# Patient Record
Sex: MALE | Race: WHITE | Employment: FULL TIME | URBAN - NONMETROPOLITAN AREA
[De-identification: names, ages, dates, MRNs, and addresses within clinical notes are randomized per-mention and may not be internally consistent; named-entity substitution may affect disease eponyms.]

---

## 2022-11-04 ENCOUNTER — OFFICE VISIT (OUTPATIENT)
Dept: PRIMARY CARE CLINIC | Age: 47
End: 2022-11-04
Payer: COMMERCIAL

## 2022-11-04 VITALS
BODY MASS INDEX: 27.4 KG/M2 | WEIGHT: 202 LBS | OXYGEN SATURATION: 97 % | SYSTOLIC BLOOD PRESSURE: 122 MMHG | DIASTOLIC BLOOD PRESSURE: 74 MMHG | HEART RATE: 74 BPM | TEMPERATURE: 98 F

## 2022-11-04 DIAGNOSIS — M79.645 PAIN IN FINGER OF BOTH HANDS: Primary | ICD-10-CM

## 2022-11-04 DIAGNOSIS — M79.644 PAIN IN FINGER OF BOTH HANDS: Primary | ICD-10-CM

## 2022-11-04 PROCEDURE — 99213 OFFICE O/P EST LOW 20 MIN: CPT | Performed by: NURSE PRACTITIONER

## 2022-11-04 RX ORDER — PREDNISONE 20 MG/1
20 TABLET ORAL 2 TIMES DAILY
Qty: 10 TABLET | Refills: 0 | Status: SHIPPED | OUTPATIENT
Start: 2022-11-04 | End: 2022-11-09

## 2022-11-04 ASSESSMENT — PATIENT HEALTH QUESTIONNAIRE - PHQ9
SUM OF ALL RESPONSES TO PHQ QUESTIONS 1-9: 0
2. FEELING DOWN, DEPRESSED OR HOPELESS: 0
SUM OF ALL RESPONSES TO PHQ9 QUESTIONS 1 & 2: 0
SUM OF ALL RESPONSES TO PHQ QUESTIONS 1-9: 0
1. LITTLE INTEREST OR PLEASURE IN DOING THINGS: 0

## 2022-11-04 ASSESSMENT — ENCOUNTER SYMPTOMS: RESPIRATORY NEGATIVE: 1

## 2022-11-04 NOTE — LETTER
[address] [facility] A department of [facility] 99  Phone: [phone]  Fax: [phone]        SHEFALI aMcias CNP      November 4, 2022    Patient:   Liz Chiang  Date of Birth   1975  Date of visit   11/4/2022        To Whom it May Concern:      Elyssa Carlisle was seen in my clinic on 11/4/2022. Please excuse from work 11/04/22, 11/05/22 and 11/06/22. If you have any questions or concerns, please don't hesitate to call.       Sincerely,      SHEFALI Macias CNP

## 2022-11-04 NOTE — PROGRESS NOTES
[facility]             [address], [address]                        Telephone [phone]             Fax [phone]     Leidy Tang  1975  PXR:[id number]   Date of visit:  11/4/2022    Subjective:    Leidy Tang is a 56 y.o.  male who presents to [facility] today (11/4/2022) for evaluation of:    Chief Complaint   Patient presents with    Hand Pain     Bilat hands knuckle swelling thinks may be arthritis related issues with finger nails think may be reaction to tacky material at work,        Hand Pain   The incident occurred more than 1 week ago (X 6 months). The injury mechanism was repetitive motion (repetitive motion at work with fine motor skills with bilateral hands and applying pressure with fingertips to parts). Pain location: bilateral index finger joints. The quality of the pain is described as aching. The pain does not radiate. The pain is at a severity of 8/10. The pain has been Constant since the incident. Pertinent negatives include no chest pain, muscle weakness, numbness or tingling. Associated symptoms comments: Swelling noted of joints of fingers of bilateral hands. Bilateral index finger DIP joints with pain. Pain increases after working. He is working 12 hour days, 7 days/week. He has also noted the last 2 months that his finger nails are dry and splitting. Nanci Mederos He has tried nothing for the symptoms. The treatment provided no relief. Requested work note. He has the following problem list:  Patient Active Problem List   Diagnosis    Ankle strain    Back pain    Herniated lumbar intervertebral disc        Current medications are:  Current Outpatient Medications   Medication Sig Dispense Refill    predniSONE (DELTASONE) 20 MG tablet Take 1 tablet by mouth 2 times daily for 5 days 10 tablet 0    Cetirizine HCl 10 MG CAPS Take 10 mg by mouth daily as needed.        No current facility-administered medications for this visit. He is allergic to seasonal.. He  reports that he has been smoking cigarettes. He has a 15.00 pack-year smoking history. He has never used smokeless tobacco.      Objective:    Vitals:    11/04/22 1255   BP: 122/74   Site: Right Upper Arm   Position: Sitting   Cuff Size: Large Adult   Pulse: 74   Temp: 98 °F (36.7 °C)   TempSrc: Tympanic   SpO2: 97%   Weight: 202 lb (91.6 kg)     Body mass index is 27.4 kg/m². Review of Systems   Constitutional: Negative. Respiratory: Negative. Cardiovascular: Negative. Negative for chest pain. Musculoskeletal:         Finger joint pain and swelling bilateral hands   Neurological:  Negative for tingling and numbness. Physical Exam  Vitals and nursing note reviewed. Constitutional:       Appearance: Normal appearance. He is well-developed. HENT:      Head: Normocephalic. Jaw: There is normal jaw occlusion. Mouth/Throat:      Lips: Pink. Mouth: Mucous membranes are moist.      Pharynx: Oropharynx is clear. Uvula midline. Eyes:      Pupils: Pupils are equal, round, and reactive to light. Cardiovascular:      Rate and Rhythm: Normal rate and regular rhythm. Heart sounds: Normal heart sounds. Pulmonary:      Effort: Pulmonary effort is normal.      Breath sounds: Normal breath sounds and air entry. Musculoskeletal:      Right hand: Normal range of motion. Normal strength. Normal sensation. There is no disruption of two-point discrimination. Normal capillary refill. Normal pulse. Left hand: Normal range of motion. Normal strength. Normal sensation. There is no disruption of two-point discrimination. Normal capillary refill. Normal pulse. Cervical back: Normal range of motion and neck supple. Comments: Swelling and redness of bilateral index finger DIP joints. Swelling noted in other DIP joints also. Bilateral hand dryness noted.  Dryness and splitting of several finger nails.   Skin:     General: Skin is warm and dry. Neurological:      General: No focal deficit present. Mental Status: He is alert and oriented to person, place, and time. Psychiatric:         Behavior: Behavior normal.         Thought Content: Thought content normal.       Assessment and Plan:    No results found for this visit on 11/04/22. Diagnosis Orders   1. Pain in finger of both hands  predniSONE (DELTASONE) 20 MG tablet        Take prednisone as directed. Take multivitamin daily. I recommended to apply moisturizing cream often throughout the day. Follow up with PCP if symptoms persist or worsen. Work note provided. The use, risks, benefits, and side effects of prescribed or recommended medications were discussed. All questions were answered and the patient/caregiver voiced understanding. No orders of the defined types were placed in this encounter.         Electronically signed by SHEFALI Guerra CNP on 11/4/22 at 1:16 PM EDT

## 2023-09-05 ENCOUNTER — OFFICE VISIT (OUTPATIENT)
Dept: FAMILY MEDICINE CLINIC | Age: 48
End: 2023-09-05
Payer: COMMERCIAL

## 2023-09-05 VITALS
WEIGHT: 199.8 LBS | BODY MASS INDEX: 27.06 KG/M2 | DIASTOLIC BLOOD PRESSURE: 74 MMHG | OXYGEN SATURATION: 97 % | SYSTOLIC BLOOD PRESSURE: 122 MMHG | TEMPERATURE: 97.9 F | HEART RATE: 88 BPM | RESPIRATION RATE: 16 BRPM | HEIGHT: 72 IN

## 2023-09-05 DIAGNOSIS — B88.0: Primary | ICD-10-CM

## 2023-09-05 PROBLEM — J30.2 SEASONAL ALLERGIES: Status: ACTIVE | Noted: 2021-07-27

## 2023-09-05 PROCEDURE — 99213 OFFICE O/P EST LOW 20 MIN: CPT | Performed by: NURSE PRACTITIONER

## 2023-09-05 RX ORDER — PREDNISONE 10 MG/1
TABLET ORAL
Qty: 20 TABLET | Refills: 0 | Status: SHIPPED | OUTPATIENT
Start: 2023-09-05 | End: 2023-09-13

## 2023-09-05 SDOH — ECONOMIC STABILITY: FOOD INSECURITY: WITHIN THE PAST 12 MONTHS, THE FOOD YOU BOUGHT JUST DIDN'T LAST AND YOU DIDN'T HAVE MONEY TO GET MORE.: NEVER TRUE

## 2023-09-05 SDOH — ECONOMIC STABILITY: FOOD INSECURITY: WITHIN THE PAST 12 MONTHS, YOU WORRIED THAT YOUR FOOD WOULD RUN OUT BEFORE YOU GOT MONEY TO BUY MORE.: NEVER TRUE

## 2023-09-05 SDOH — ECONOMIC STABILITY: HOUSING INSECURITY
IN THE LAST 12 MONTHS, WAS THERE A TIME WHEN YOU DID NOT HAVE A STEADY PLACE TO SLEEP OR SLEPT IN A SHELTER (INCLUDING NOW)?: NO

## 2023-09-05 SDOH — ECONOMIC STABILITY: INCOME INSECURITY: HOW HARD IS IT FOR YOU TO PAY FOR THE VERY BASICS LIKE FOOD, HOUSING, MEDICAL CARE, AND HEATING?: NOT HARD AT ALL

## 2023-09-05 ASSESSMENT — PATIENT HEALTH QUESTIONNAIRE - PHQ9
SUM OF ALL RESPONSES TO PHQ QUESTIONS 1-9: 0
SUM OF ALL RESPONSES TO PHQ QUESTIONS 1-9: 0
2. FEELING DOWN, DEPRESSED OR HOPELESS: 0
SUM OF ALL RESPONSES TO PHQ9 QUESTIONS 1 & 2: 0
SUM OF ALL RESPONSES TO PHQ QUESTIONS 1-9: 0
1. LITTLE INTEREST OR PLEASURE IN DOING THINGS: 0
SUM OF ALL RESPONSES TO PHQ QUESTIONS 1-9: 0

## 2023-09-05 NOTE — PATIENT INSTRUCTIONS
Prednisone taper as directed. Follow up with primary care provider in 1 to 2 days if needed. Note for today.   Recommend preventative health care

## 2023-09-26 ENCOUNTER — INITIAL CONSULT (OUTPATIENT)
Dept: SURGERY | Age: 48
End: 2023-09-26
Payer: COMMERCIAL

## 2023-09-26 VITALS
DIASTOLIC BLOOD PRESSURE: 84 MMHG | WEIGHT: 199 LBS | SYSTOLIC BLOOD PRESSURE: 122 MMHG | BODY MASS INDEX: 26.95 KG/M2 | HEART RATE: 76 BPM | HEIGHT: 72 IN

## 2023-09-26 DIAGNOSIS — K62.5 RECTAL BLEEDING: Primary | ICD-10-CM

## 2023-09-26 DIAGNOSIS — Z12.11 ENCOUNTER FOR SCREENING COLONOSCOPY: ICD-10-CM

## 2023-09-26 PROCEDURE — 99204 OFFICE O/P NEW MOD 45 MIN: CPT | Performed by: SURGERY

## 2023-09-26 RX ORDER — BISACODYL 5 MG/1
TABLET, DELAYED RELEASE ORAL
Qty: 2 TABLET | Refills: 0 | Status: SHIPPED | OUTPATIENT
Start: 2023-09-26

## 2023-09-26 RX ORDER — POLYETHYLENE GLYCOL 3350, SODIUM SULFATE ANHYDROUS, SODIUM BICARBONATE, SODIUM CHLORIDE, POTASSIUM CHLORIDE 236; 22.74; 6.74; 5.86; 2.97 G/4L; G/4L; G/4L; G/4L; G/4L
POWDER, FOR SOLUTION ORAL
Qty: 4000 ML | Refills: 0 | Status: SHIPPED | OUTPATIENT
Start: 2023-09-26

## 2023-09-26 NOTE — PROGRESS NOTES
Lillian Pickett is a 53 y.o. male who presents today for further evaluation of possible hemorrhoid. Patient reports for the past few years that he has noticed a \"polyp\" that he can feel when he is cleaning after bowel movements. Has used preparation H in the past and states that the lump is seem to go down but more recently has begun to notice second lump that he can feel may be a little deeper when he is cleaning after bowel movements. Not painful to touch. Does state that it is present at all times. Does occasionally get some blood per rectum with bowel movements that staining paper. Not had any other changes in bowel movements. No unintended weight loss. Denies any family history of colon cancer. Has never had colon cancer screening before. Comes in today for further evaluation and recommendations. Past Medical History:   Diagnosis Date    Chronic back pain     occassional    Measles     Seasonal allergies     Sleep apnea     QUESTIONABLE-NOT FORMALLY DX    Strain of ankle, left 5/14/2013    Tobacco use        Past Surgical History:   Procedure Laterality Date    HAND SURGERY Left 7/10/2013    juliet mass removed Dr Tadeo Luna biopsy was sent    LUMBAR DISCECTOMY  05/20/14    [id number]    PRE-MALIGNANT / BENIGN SKIN LESION EXCISION  2003-2005    X 10 MOLE REMOVAL NECK, CHEST, BACK, ABD AND LT ARM       Current Outpatient Medications   Medication Sig Dispense Refill    polyethylene glycol (GOLYTELY) 236 g solution Mix as directed. At 4pm the day prior to your procedure, drink an 8oz glass every 10 minutes until gone. 4000 mL 0    bisacodyl 5 MG EC tablet Take 2 tabs by mouth at noon the day prior to your procedure. 2 tablet 0     No current facility-administered medications for this visit.        Allergies   Allergen Reactions    Seasonal        Family History   Problem Relation Age of Onset    Heart Disease Mother         quad by pass    Diabetes Father     Cancer Father         lung    Mult

## 2023-09-26 NOTE — ASSESSMENT & PLAN NOTE
Patient is due for colonoscopy for screening. We will go ahead and plan for colonoscopy for and this will also give us a better chance to evaluate for any small internal hemorrhoids. We will tentatively plan for hemorrhoid banding at the same time. Risks of the procedure including bleeding, infection, perforation, discomfort after procedure and anesthesia risk are discussed and consent is obtained.

## 2023-10-05 ENCOUNTER — ANESTHESIA (OUTPATIENT)
Dept: OPERATING ROOM | Age: 48
End: 2023-10-05
Payer: COMMERCIAL

## 2023-10-05 ENCOUNTER — HOSPITAL ENCOUNTER (OUTPATIENT)
Age: 48
Setting detail: OUTPATIENT SURGERY
Discharge: HOME | End: 2023-10-05
Attending: SURGERY | Admitting: SURGERY
Payer: COMMERCIAL

## 2023-10-05 ENCOUNTER — ANESTHESIA EVENT (OUTPATIENT)
Dept: OPERATING ROOM | Age: 48
End: 2023-10-05
Payer: COMMERCIAL

## 2023-10-05 VITALS
DIASTOLIC BLOOD PRESSURE: 65 MMHG | TEMPERATURE: 97.3 F | HEART RATE: 73 BPM | HEIGHT: 72 IN | WEIGHT: 198 LBS | BODY MASS INDEX: 26.82 KG/M2 | SYSTOLIC BLOOD PRESSURE: 115 MMHG | OXYGEN SATURATION: 98 % | RESPIRATION RATE: 16 BRPM

## 2023-10-05 PROCEDURE — 3700000000 HC ANESTHESIA ATTENDED CARE: Performed by: SURGERY

## 2023-10-05 PROCEDURE — 2709999900 HC NON-CHARGEABLE SUPPLY: Performed by: SURGERY

## 2023-10-05 PROCEDURE — 2580000003 HC RX 258: Performed by: SURGERY

## 2023-10-05 PROCEDURE — 3700000001 HC ADD 15 MINUTES (ANESTHESIA): Performed by: SURGERY

## 2023-10-05 PROCEDURE — 3609027000 HC COLONOSCOPY: Performed by: SURGERY

## 2023-10-05 PROCEDURE — 6360000002 HC RX W HCPCS: Performed by: NURSE ANESTHETIST, CERTIFIED REGISTERED

## 2023-10-05 PROCEDURE — 7100000010 HC PHASE II RECOVERY - FIRST 15 MIN: Performed by: SURGERY

## 2023-10-05 PROCEDURE — 2580000003 HC RX 258: Performed by: NURSE ANESTHETIST, CERTIFIED REGISTERED

## 2023-10-05 PROCEDURE — 7100000011 HC PHASE II RECOVERY - ADDTL 15 MIN: Performed by: SURGERY

## 2023-10-05 RX ORDER — PROPOFOL 10 MG/ML
INJECTION, EMULSION INTRAVENOUS PRN
Status: DISCONTINUED | OUTPATIENT
Start: 2023-10-05 | End: 2023-10-05 | Stop reason: SDUPTHER

## 2023-10-05 RX ORDER — SODIUM CHLORIDE, SODIUM LACTATE, POTASSIUM CHLORIDE, CALCIUM CHLORIDE 600; 310; 30; 20 MG/100ML; MG/100ML; MG/100ML; MG/100ML
INJECTION, SOLUTION INTRAVENOUS CONTINUOUS
Status: DISCONTINUED | OUTPATIENT
Start: 2023-10-05 | End: 2023-10-05 | Stop reason: HOSPADM

## 2023-10-05 RX ORDER — SODIUM CHLORIDE 0.9 % (FLUSH) 0.9 %
5-40 SYRINGE (ML) INJECTION PRN
Status: DISCONTINUED | OUTPATIENT
Start: 2023-10-05 | End: 2023-10-05 | Stop reason: HOSPADM

## 2023-10-05 RX ORDER — SODIUM CHLORIDE 9 MG/ML
INJECTION, SOLUTION INTRAVENOUS PRN
Status: DISCONTINUED | OUTPATIENT
Start: 2023-10-05 | End: 2023-10-05 | Stop reason: HOSPADM

## 2023-10-05 RX ORDER — SODIUM CHLORIDE, SODIUM LACTATE, POTASSIUM CHLORIDE, CALCIUM CHLORIDE 600; 310; 30; 20 MG/100ML; MG/100ML; MG/100ML; MG/100ML
INJECTION, SOLUTION INTRAVENOUS CONTINUOUS PRN
Status: DISCONTINUED | OUTPATIENT
Start: 2023-10-05 | End: 2023-10-05 | Stop reason: SDUPTHER

## 2023-10-05 RX ORDER — SODIUM CHLORIDE 0.9 % (FLUSH) 0.9 %
5-40 SYRINGE (ML) INJECTION EVERY 12 HOURS SCHEDULED
Status: DISCONTINUED | OUTPATIENT
Start: 2023-10-05 | End: 2023-10-05 | Stop reason: HOSPADM

## 2023-10-05 RX ADMIN — SODIUM CHLORIDE, POTASSIUM CHLORIDE, SODIUM LACTATE AND CALCIUM CHLORIDE: 600; 310; 30; 20 INJECTION, SOLUTION INTRAVENOUS at 09:50

## 2023-10-05 RX ADMIN — PROPOFOL 140 MCG/KG/MIN: 10 INJECTION, EMULSION INTRAVENOUS at 10:02

## 2023-10-05 RX ADMIN — SODIUM CHLORIDE, POTASSIUM CHLORIDE, SODIUM LACTATE AND CALCIUM CHLORIDE: 600; 310; 30; 20 INJECTION, SOLUTION INTRAVENOUS at 10:00

## 2023-10-05 RX ADMIN — PROPOFOL 150 MG: 10 INJECTION, EMULSION INTRAVENOUS at 10:01

## 2023-10-05 ASSESSMENT — LIFESTYLE VARIABLES: SMOKING_STATUS: 1

## 2023-10-05 ASSESSMENT — PAIN SCALES - GENERAL
PAINLEVEL_OUTOF10: 0

## 2023-10-05 ASSESSMENT — PAIN - FUNCTIONAL ASSESSMENT: PAIN_FUNCTIONAL_ASSESSMENT: 0-10

## 2023-10-05 NOTE — H&P
Subjective   Ewa Atkins is a 53 y.o. male who presents today for further evaluation of possible hemorrhoid. Patient reports for the past few years that he has noticed a \"polyp\" that he can feel when he is cleaning after bowel movements. Has used preparation H in the past and states that the lump is seem to go down but more recently has begun to notice second lump that he can feel may be a little deeper when he is cleaning after bowel movements. Not painful to touch. Does state that it is present at all times. Does occasionally get some blood per rectum with bowel movements that staining paper. Not had any other changes in bowel movements. No unintended weight loss. Denies any family history of colon cancer. Has never had colon cancer screening before. Comes in today for further evaluation and recommendations. Past Medical History        Past Medical History:   Diagnosis Date    Chronic back pain       occassional    Measles      Seasonal allergies      Sleep apnea       QUESTIONABLE-NOT FORMALLY DX    Strain of ankle, left 5/14/2013    Tobacco use              Past Surgical History         Past Surgical History:   Procedure Laterality Date    HAND SURGERY Left 7/10/2013     juliet mass removed Dr Bill Curran biopsy was sent    LUMBAR DISCECTOMY   05/20/14     [id number]    PRE-MALIGNANT / BENIGN SKIN LESION EXCISION   2003-2005     X 10 MOLE REMOVAL NECK, CHEST, BACK, ABD AND LT ARM            Current Facility-Administered Medications          Current Outpatient Medications   Medication Sig Dispense Refill    polyethylene glycol (GOLYTELY) 236 g solution Mix as directed. At 4pm the day prior to your procedure, drink an 8oz glass every 10 minutes until gone. 4000 mL 0    bisacodyl 5 MG EC tablet Take 2 tabs by mouth at noon the day prior to your procedure. 2 tablet 0      No current facility-administered medications for this visit.                  Allergies   Allergen Reactions    Seasonal

## 2023-10-05 NOTE — OP NOTE
[address] [facility] [address]                 [address] [facility], [address]                                OPERATIVE REPORT    PATIENT NAME: Tray Kimball                       :        1975  MED REC NO:   [id number]                             ROOM:  ACCOUNT NO:   [de-identified]                           ADMIT DATE: 10/05/2023  PROVIDER:     Bebe Ellington    DATE OF PROCEDURE:  10/05/2023    SURGEON:  Dr. Bebe Ellington. ASSISTANT:  None. PREOPERATIVE DIAGNOSIS:  Screening. POSTOPERATIVE DIAGNOSES:  1.  Screening. 2.  Diverticulosis. 3.  Small nonthrombosed external hemorrhoids. PROCEDURE:  Colonoscopy. ANESTHESIA:  MAC.    ESTIMATED BLOOD LOSS:  Minimal.    FLUIDS:  Per anesthesia record. COMPLICATIONS:  None. SPECIMENS:  None. INDICATION FOR PROCEDURE:  The patient is a 52-year-old gentleman  referred to my office for screening as well as some complaints of  possible hemorrhoids. When he was evaluated in the office, he was noted  to have a small nonthrombosed external hemorrhoid, but no other  findings. We decided to go ahead and proceed with colonoscopy with  possible hemorrhoid banding. Prior to the time of the procedure, risks,  benefits, and alternatives were explained to the patient and consent was  obtained. DESCRIPTION OF PROCEDURE:  The patient was brought to the endoscopy  suite, kept on preop gurney, and placed in the left lateral decubitus  position. Monitoring devices were placed. MAC anesthesia was induced. After induction of anesthesia, time-out was performed, and correct  patient and procedure were verified. Digital rectal exam was performed  which showed no significant abnormalities. The Olympus video endoscope  was lubricated, inserted into the patient's rectum which was gently  insufflated with air.   Scope was then slowly advanced through colon  under visualization to the level of cecum which was identified

## 2023-10-05 NOTE — DISCHARGE INSTRUCTIONS
Discharge Instructions for Colonoscopy     Colonoscopy is a visual exam of the lining of the large intestine, also called the bowel or colon, with a colonoscope. A colonoscope is a flexible tube with a light and a viewing device. It allows the doctor to view the inside of the colon through a tiny video camera. Colonoscopy is performed for many reasons: unexplained anemia , pain, diarrhea , bloody stools, cancer screening, among many other reasons. Complications from a colonoscopy are rare. Some possible serious complications include perforated bowel (which might require surgery) and bleeding (which could require blood transfusion ). Minor complications include bloating, gas, and cramping that can last for 1-2 days after the procedure. Because air is put into your colon during the procedure, it is normal to pass large amounts of air from your rectum. You may not have a bowel movement for 1-3 days after the procedure. What You Will Need   Someone to drive you home after the procedure    Steps to [facility] when you get home. Because the sedative will make you drowsy, don't drive, operate machinery, or make important decisions the day of the procedure. Feelings of bloating, gas, or cramping may persist for 24 hours. Diet    Try sips of water first. If tolerated, resume regular diet or the diet recommended by your physician. Do not drink alcohol for 24 hours. Physical Activity    Ask your doctor when you will be able to return to work. Do not drive, operate heavy machinery, or do activities that require coordination or balance for 24 hours. Otherwise, return to your normal routine as soon as you are comfortable to do so, which is usually the next day after the procedure. Medications    When taking medications, it's important to: Take your medication as directednot more, not less, not at a different time.    Do not stop taking them without consulting your healthcare

## 2023-10-05 NOTE — ANESTHESIA POSTPROCEDURE EVALUATION
Department of Anesthesiology  Postprocedure Note    Patient: Odette Masters  MRN: [id number]  [address] [facility]: 1975  Date of evaluation: 10/5/2023      Procedure Summary     Date: 10/05/23 Room / Location: [facility]    Anesthesia Start: 1000 Anesthesia Stop: 1024    Procedure: COLONOSCOPY with possible hemorrhoid banding (Anus) Diagnosis:       Blood per rectum      (Blood per rectum [K62.5])    Surgeons: Zoya Eli DO Responsible Provider: SHEFALI Enriquez CRNA    Anesthesia Type: General, TIVA ASA Status: 2          Anesthesia Type: General, TIVA    Colt Phase I: Colt Score: 10    Colt Phase II:        Anesthesia Post Evaluation    Patient location during evaluation: bedside  Level of consciousness: sleepy but conscious  Airway patency: patent  Nausea & Vomiting: no nausea and no vomiting  Complications: no  Cardiovascular status: hemodynamically stable  Respiratory status: spontaneous ventilation  Hydration status: euvolemic  Pain management: satisfactory to patient

## (undated) DEVICE — 4-PORT MANIFOLD: Brand: NEPTUNE 2

## (undated) DEVICE — CO2 CANNULA,SSOFT,ADLT,7O2,4CO2,FEMALE: Brand: MEDLINE

## (undated) DEVICE — MERCY DEFIANCE ENDO KIT: Brand: MEDLINE INDUSTRIES, INC.